# Patient Record
Sex: MALE | Race: WHITE | NOT HISPANIC OR LATINO | Employment: OTHER | URBAN - METROPOLITAN AREA
[De-identification: names, ages, dates, MRNs, and addresses within clinical notes are randomized per-mention and may not be internally consistent; named-entity substitution may affect disease eponyms.]

---

## 2018-01-24 ENCOUNTER — OFFICE VISIT (OUTPATIENT)
Dept: FAMILY MEDICINE | Facility: CLINIC | Age: 33
End: 2018-01-24
Payer: OTHER GOVERNMENT

## 2018-01-24 VITALS
SYSTOLIC BLOOD PRESSURE: 90 MMHG | DIASTOLIC BLOOD PRESSURE: 60 MMHG | RESPIRATION RATE: 16 BRPM | WEIGHT: 152.13 LBS | HEART RATE: 88 BPM | OXYGEN SATURATION: 99 % | HEIGHT: 69 IN | BODY MASS INDEX: 22.53 KG/M2

## 2018-01-24 DIAGNOSIS — R42 POSTURAL DIZZINESS WITH PRESYNCOPE: Primary | ICD-10-CM

## 2018-01-24 DIAGNOSIS — R55 POSTURAL DIZZINESS WITH PRESYNCOPE: Primary | ICD-10-CM

## 2018-01-24 PROCEDURE — 93005 ELECTROCARDIOGRAM TRACING: CPT | Mod: PBBFAC | Performed by: FAMILY MEDICINE

## 2018-01-24 PROCEDURE — 93010 ELECTROCARDIOGRAM REPORT: CPT | Mod: ,,, | Performed by: INTERNAL MEDICINE

## 2018-01-24 PROCEDURE — 99999 PR PBB SHADOW E&M-NEW PATIENT-LVL V: CPT | Mod: PBBFAC,,, | Performed by: FAMILY MEDICINE

## 2018-01-24 PROCEDURE — 99213 OFFICE O/P EST LOW 20 MIN: CPT | Mod: S$PBB,25,, | Performed by: FAMILY MEDICINE

## 2018-01-24 PROCEDURE — 99205 OFFICE O/P NEW HI 60 MIN: CPT | Mod: PBBFAC | Performed by: FAMILY MEDICINE

## 2018-01-24 NOTE — PATIENT INSTRUCTIONS
Understanding Dizziness, Balance Problems, and Fainting     The eyes, inner ear, joints, and muscles send signals to the brain to achieve balance.     Balance is a group effort of the eyes, inner ear, joints, and muscles. They each send signals to the brain about body position and head movement. Then the brain uses this information to achieve balance. When the brain receives conflicting signals, or when there is a problem with blood flow, dizziness or fainting can happen.  Vertigo  Vertigo is the feeling of spinning. It may happen if the brain receives conflicting balance signals. Vertigo is often caused by a problem in the inner ear. Problems include changes in inner ear structures, infection, swelling, or excess fluid. Sometimes vertigo is due to a brain problem, such as migraine.   Dysequilibrium  Dysequilibrium is the feeling of imbalance without a sense of spinning. It may happen if the signal path between the body and brain is disrupted. There are many causes of dysequilibrium, including diabetes, anemia, head injury, and aging.  Syncope  Syncope is losing consciousness or fainting. The brain needs oxygen-rich blood to function. The heart pumps that blood to the brain. If there is a problem with the heart, blood flow (such as low blood pressure), or blood vessels, faintness may happen.  Date Last Reviewed: 10/6/2015  © 8744-2956 [employer]. [address] [place] [address], [place] [address]. All rights reserved. This information is not intended as a substitute for professional medical care. Always follow your healthcare professional's instructions.

## 2018-01-24 NOTE — LETTER
January 24, 2018      [address] [address]  Phone: [phone]  Fax: [phone]       Patient: Gagandeep Osborne   YOB: 1985  Date of Visit: 01/24/2018    To Whom It May Concern:    Hipolito Osborne  was at [facility] on 01/24/2018. He may return to work  1/25/18. He presents today with complaints of feeling faint and was diagnosed with orthostatic hypotension. He is going to cardiology for further work up. This can lead to decreased tolerance of exercise and especially with change in position. He will increase his fluid and salt intake for now and may need medications if his symptoms do not resolve. If you have any questions or concerns, or if I can be of further assistance, please do not hesitate to contact me.    Sincerely,    Nikki Regalado MD

## 2018-01-24 NOTE — PROGRESS NOTES
Subjective:       Patient ID: Gagandeep Osborne is a 32 y.o. male.    Chief Complaint: blacking out/ fainting (every 2 to 3 days)    HPI  32 year old male comes in with c/o syncope. He says that when he stands he has light-headedness and sometimes his vision goes black. He has some 'shocking' feelings in the tongue. He says that he has this every 1-2 days. His wife is here and says that when he does this 'he goes blank.' He has never fallen down. He can 'grab something' to prevent him from falling. He has had the same symptoms since he was a kid.    PMH, PSH, ALLERGIES, SH, FH reviewed in nurse's notes above  Medications reconciled in the nurse's notes      Review of Systems   Constitutional: Negative for chills and fever.   HENT: Negative for congestion, ear pain, postnasal drip, rhinorrhea, sore throat and trouble swallowing.    Eyes: Negative for redness and itching.   Respiratory: Negative for cough, shortness of breath and wheezing.    Cardiovascular: Negative for chest pain and palpitations.   Gastrointestinal: Negative for abdominal pain, diarrhea, nausea and vomiting.   Genitourinary: Negative for dysuria and frequency.   Skin: Negative for rash.   Neurological: Positive for syncope. Negative for weakness and headaches.       Objective:      Physical Exam   Constitutional: He is oriented to person, place, and time. He appears well-developed. No distress.   HENT:   Head: Normocephalic and atraumatic.   Eyes: Conjunctivae are normal. Pupils are equal, round, and reactive to light.   Neck: Normal range of motion. Neck supple. No thyromegaly present.   Cardiovascular: Normal rate, regular rhythm, normal heart sounds and intact distal pulses.    Pulmonary/Chest: Effort normal and breath sounds normal. No respiratory distress. He has no wheezes.   Abdominal: Soft. Bowel sounds are normal. There is no tenderness.   Musculoskeletal: Normal range of motion. He exhibits no edema.   Lymphadenopathy:     He has no cervical  adenopathy.   Neurological: He is alert and oriented to person, place, and time.   Skin: Skin is warm and dry. No rash noted.   Psychiatric: He has a normal mood and affect. His behavior is normal.   Nursing note and vitals reviewed.       Assessment/Plan:       Problem List Items Addressed This Visit     None      Visit Diagnoses     Postural dizziness with presyncope    -  Primary    Relevant Orders    EKG 12-lead    Ambulatory referral to Cardiology        RTC if condition acutely worsens or any other concerns, otherwise RTC as scheduled    Orthostatics:  L:100/54  Sittin/70  Standin/60

## 2018-01-30 ENCOUNTER — OFFICE VISIT (OUTPATIENT)
Dept: FAMILY MEDICINE | Facility: CLINIC | Age: 33
End: 2018-01-30
Payer: OTHER GOVERNMENT

## 2018-01-30 VITALS
DIASTOLIC BLOOD PRESSURE: 70 MMHG | WEIGHT: 154.31 LBS | BODY MASS INDEX: 22.85 KG/M2 | HEART RATE: 76 BPM | SYSTOLIC BLOOD PRESSURE: 106 MMHG | HEIGHT: 69 IN

## 2018-01-30 DIAGNOSIS — Z30.09 VISIT FOR VASECTOMY EVALUATION: Primary | ICD-10-CM

## 2018-01-30 PROCEDURE — 99213 OFFICE O/P EST LOW 20 MIN: CPT | Mod: S$PBB,,, | Performed by: FAMILY MEDICINE

## 2018-01-30 PROCEDURE — 99213 OFFICE O/P EST LOW 20 MIN: CPT | Mod: PBBFAC | Performed by: FAMILY MEDICINE

## 2018-01-30 PROCEDURE — 99999 PR PBB SHADOW E&M-EST. PATIENT-LVL III: CPT | Mod: PBBFAC,,, | Performed by: FAMILY MEDICINE

## 2018-01-30 RX ORDER — DIAZEPAM 5 MG/1
5 TABLET ORAL EVERY 6 HOURS PRN
Qty: 2 TABLET | Refills: 0 | Status: SHIPPED | OUTPATIENT
Start: 2018-01-30 | End: 2018-03-19 | Stop reason: SDUPTHER

## 2018-01-30 NOTE — PROGRESS NOTES
Subjective:       Patient ID: Gagandeep Osborne is a 32 y.o. male.    Chief Complaint: Consult (vasectomy)    Patient is requesting vasectomy.  All of his questions regarding the procedure were answered.  He has 2 children.  They do not want anymore children.  His wife did have a BTL performed.  I told him that vasectomy was not necessary but he insists to get one.      Review of Systems   Constitutional: Negative for activity change, fever and unexpected weight change.   Respiratory: Negative for cough and shortness of breath.    Cardiovascular: Negative for chest pain and palpitations.   Gastrointestinal: Negative for abdominal pain, constipation and diarrhea.   Genitourinary: Negative for discharge, penile pain and testicular pain.       Objective:      Vitals:    01/30/18 0842   BP: 106/70   Pulse: 76     Physical Exam   Constitutional: He appears well-developed and well-nourished.   Eyes: EOM are normal. Pupils are equal, round, and reactive to light.   Cardiovascular: Normal rate, regular rhythm, normal heart sounds and intact distal pulses.  Exam reveals no gallop and no friction rub.    No murmur heard.  Pulmonary/Chest: Effort normal and breath sounds normal.   Abdominal: Soft. Bowel sounds are normal. He exhibits no mass. There is no tenderness. Hernia confirmed negative in the right inguinal area and confirmed negative in the left inguinal area.   Genitourinary: Testes normal and penis normal. Right testis shows no mass. Left testis shows no mass. Circumcised.   Lymphadenopathy: No inguinal adenopathy noted on the right or left side.       Assessment:       1. Visit for vasectomy evaluation        Plan:   Gagandeep was seen today for consult.    Diagnoses and all orders for this visit:    Visit for vasectomy evaluation  -     diazePAM (VALIUM) 5 MG tablet; Take 1 tablet (5 mg total) by mouth every 6 (six) hours as needed for Anxiety.     schedule vasectomy.  Patient given handout.

## 2018-01-30 NOTE — PATIENT INSTRUCTIONS
Having a Vasectomy: Before, During, and After the Procedure     The cut ends of the vas may be tied, closed with a clip, or sealed by heat (cauterized).   Vasectomy is an outpatient procedure. This means you can go home the same day. It can be done in a doctors office, clinic, or hospital. Your doctor will talk with you about how to get ready for surgery. He or she will also discuss the possible risks and complications with you. After the procedure, follow your doctors advice for recovery.  Getting ready for surgery  Your doctor will talk with you about getting ready for surgery. You may be asked to do the following:  · Sign a consent form. This must be done at least a few days before surgery. It gives your doctor permission to do the procedure. It also states that a vasectomy is not guaranteed to make you sterile.  · Dont take aspirin, ibuprofen, or naproxen for 2 weeks before surgery. These medicines can cause bleeding after the procedure. Also, tell your doctor if you take any medicines, supplements, or herbal remedies.  · Tell your doctor if youve had any scrotal surgery in the past.  · Arrange for an adult family member or friend to give you a ride home after surgery.  · Shower and clean your scrotum the day of surgery. Your doctor may also ask you to shave your scrotum.  · Bring a jock strap (athletic supporter) or pair of snug cotton briefs to the doctors office or hospital.  · Eat no more than a light snack before surgery.  During surgery  The entire procedure usually lasts less than 30 minutes.  · Youll be asked to undress and lie on a table.  · You may be given medicine to help you relax. To prevent pain during surgery, youll be given an injection of pain medicine in your scrotum or lower groin.  · Once the area is numb, the doctor makes one or two small incisions in the scrotum. This may be done with a scalpel or with a pointed clamp (no-scalpel method).  · The vas deferens are lifted through the  incision and cut. The provider seals off the ends of the vas deferens using one of several methods.  · If needed, the incision is closed with stitches.  · You can rest for a while until youre ready to go home.  Recovering at home  For about a week, your scrotum may look bruised and slightly swollen. You may also have a small amount of bloody discharge from the incision. This is normal.  To help make your recovery more comfortable, follow the tips below.  · Stay off your feet as much as possible for the first 2 days. Try to lie flat on a bed or sofa.  · Wear an athletic supporter or snug cotton briefs for support.  · Reduce swelling by using an ice pack or bag of frozen peas wrapped in a thin towel. Put the ice pack or towel on your scrotum.  · Take medicines with acetaminophen to relieve any discomfort. Dont use aspirin, ibuprofen, or naproxen.  · Wait 48 hours before bathing.  · Avoid heavy lifting or exercise for 7 days.  · Ask your doctor how long to wait before having sex again. Remember: You must use another form of birth control until youre completely sterile.  When to seek medical care  Call your doctor if you notice any of the following after surgery:  · Increasing pain or swelling in your scrotum  · A large black-and-blue area, or a growing lump  · Fever or chills  · Increasing redness or drainage of the incision  · Trouble urinating   Sex after vasectomy  Vasectomy doesnt change your sexual function. So when you start having sex again, it should feel the same as before. A vasectomy also shouldnt affect your relationship with your partner. Its important to remember, though, that you wont become sterile right away. It will take time before you can have sex without the need for birth control.  · Until youre sterile: After a vasectomy, some active sperm still remain in your semen. It will take time and many ejaculations before the sperm are completely gone. During this period, you must use another  birth control method to prevent pregnancy. To make sure no sperm are left in your semen, youll need to have one or more semen exams. You usually collect a semen sample at home and bring it to a lab. The sample is then checked under a microscope. Youre sterile only when these samples show no evidence of sperm. Ask your doctor whether additional follow-up is needed.  · After youre sterile: After your doctor tells you youre sterile, you no longer need to use any form of birth control. Youre free to have sex without the fear of unwanted pregnancy. But a vasectomy does not protect you from sexually transmitted diseases (STDs). If you have more than one sex partner, be sure to practice safer sex by using condoms.  Date Last Reviewed: 1/1/2017  © 4770-4471 [employer]. [address] [place] [address], [place] [address]. All rights reserved. This information is not intended as a substitute for professional medical care. Always follow your healthcare professional's instructions.

## 2018-03-16 ENCOUNTER — TELEPHONE (OUTPATIENT)
Dept: FAMILY MEDICINE | Facility: CLINIC | Age: 33
End: 2018-03-16

## 2018-03-19 DIAGNOSIS — Z30.09 VISIT FOR VASECTOMY EVALUATION: ICD-10-CM

## 2018-03-19 RX ORDER — DIAZEPAM 5 MG/1
5 TABLET ORAL EVERY 6 HOURS PRN
Qty: 2 TABLET | Refills: 0 | Status: SHIPPED | OUTPATIENT
Start: 2018-03-19 | End: 2018-03-28 | Stop reason: ALTCHOICE

## 2018-03-19 NOTE — TELEPHONE ENCOUNTER
----- Message from Willis Regan sent at 3/19/2018 11:53 AM CDT -----  Contact: Patient  Gagandeep Osboren  MRN: [id number]  : 1985  PCP: Nikki Regalado  Home Phone      [phone]  Work Phone      Not on file.  Mobile          [phone]      MESSAGE: has appt Wed for Vasectomy with Dr Montes -- requesting Rx for Valium (did not  the one sent in  -- uses [facility] in [place]    Call [phone]    PCP: Sabine

## 2018-03-21 ENCOUNTER — OFFICE VISIT (OUTPATIENT)
Dept: FAMILY MEDICINE | Facility: CLINIC | Age: 33
End: 2018-03-21
Payer: OTHER GOVERNMENT

## 2018-03-21 VITALS
WEIGHT: 152.63 LBS | RESPIRATION RATE: 18 BRPM | HEIGHT: 69 IN | SYSTOLIC BLOOD PRESSURE: 118 MMHG | DIASTOLIC BLOOD PRESSURE: 60 MMHG | BODY MASS INDEX: 22.61 KG/M2 | HEART RATE: 68 BPM

## 2018-03-21 DIAGNOSIS — Z30.2 ENCOUNTER FOR VASECTOMY: Primary | ICD-10-CM

## 2018-03-21 PROCEDURE — 55250 REMOVAL OF SPERM DUCT(S): CPT | Mod: PBBFAC | Performed by: FAMILY MEDICINE

## 2018-03-21 PROCEDURE — 88302 TISSUE EXAM BY PATHOLOGIST: CPT | Mod: 26,,, | Performed by: PATHOLOGY

## 2018-03-21 PROCEDURE — 88305 TISSUE EXAM BY PATHOLOGIST: CPT | Performed by: PATHOLOGY

## 2018-03-21 PROCEDURE — 99213 OFFICE O/P EST LOW 20 MIN: CPT | Mod: PBBFAC,25 | Performed by: FAMILY MEDICINE

## 2018-03-21 PROCEDURE — 88305 TISSUE EXAM BY PATHOLOGIST: CPT | Mod: 26,,, | Performed by: PATHOLOGY

## 2018-03-21 PROCEDURE — 99499 UNLISTED E&M SERVICE: CPT | Mod: S$PBB,,, | Performed by: FAMILY MEDICINE

## 2018-03-21 PROCEDURE — 55250 REMOVAL OF SPERM DUCT(S): CPT | Mod: S$PBB,,, | Performed by: FAMILY MEDICINE

## 2018-03-21 PROCEDURE — 99999 PR PBB SHADOW E&M-EST. PATIENT-LVL III: CPT | Mod: PBBFAC,,, | Performed by: FAMILY MEDICINE

## 2018-03-21 RX ORDER — HYDROCODONE BITARTRATE AND ACETAMINOPHEN 7.5; 325 MG/1; MG/1
1 TABLET ORAL EVERY 6 HOURS PRN
Qty: 15 TABLET | Refills: 0 | Status: SHIPPED | OUTPATIENT
Start: 2018-03-21 | End: 2018-03-28 | Stop reason: SDUPTHER

## 2018-03-21 NOTE — PROGRESS NOTES
Gagandeep Osborne is a 32 y.o. male    Procedure: Vasectomy    Preop diagnosis: Desired fertility  Postop diagnosis: Vasectomy  Estimated blood loss 5 cc    Procedure in detail: After patient signed informed consent and answering all of his questions procedure began.  The scrotum was prepped and draped sterilely using Betadine.  The right scrotum was anesthetized using 1% Xylocaine.  The vas deferens was palpated and right against the scrotal skin.  Linear incision was made using a 15 scalpel blade.  The vas deferens was located and isolated using 2 needle clamps.  The vas was brought out from the incision using blunt and sharp dissection.  The distal end was tied using 3-0 chromic.  10 mm section of the vas deferens was removed.  The proximal end was dropped back into the scrotum.  The distal end was hyfrecated and the Tunica fascia was closed using 3-0 chromic using an interlocking running suture leaving the distal end outside the tunica.   This was dropped back into the scrotum.  The scrotal incision was closed using 3-0 chromic using running interlocking fashion.  The left side was performed using identical technique as the right and no hematoma or unusual bleeding was noted.  The specimens were sent to pathology.   Wounds were cleaned and dressed.  He put on his scrotal support and felt fine.  After half hour of observation he was allowed to go home.  He will return to clinic in 6 weeks for a sperm count.    Encounter for vasectomy  -     Vasectomy  -     hydrocodone-acetaminophen 7.5-325mg (NORCO) 7.5-325 mg per tablet; Take 1 tablet by mouth every 6 (six) hours as needed for Pain.  Dispense: 15 tablet; Refill: 0  -     Tissue Specimen To Pathology, Internal Medicine

## 2018-03-28 ENCOUNTER — OFFICE VISIT (OUTPATIENT)
Dept: FAMILY MEDICINE | Facility: CLINIC | Age: 33
End: 2018-03-28
Payer: OTHER GOVERNMENT

## 2018-03-28 VITALS
BODY MASS INDEX: 22.14 KG/M2 | HEIGHT: 69 IN | HEART RATE: 76 BPM | SYSTOLIC BLOOD PRESSURE: 98 MMHG | WEIGHT: 149.5 LBS | DIASTOLIC BLOOD PRESSURE: 64 MMHG | RESPIRATION RATE: 16 BRPM

## 2018-03-28 DIAGNOSIS — Z30.2 ENCOUNTER FOR VASECTOMY: ICD-10-CM

## 2018-03-28 DIAGNOSIS — N50.819 EVALUATION OF POSTOPERATIVE TESTICULAR PAIN WITHIN 30 DAYS OF VASECTOMY: Primary | ICD-10-CM

## 2018-03-28 DIAGNOSIS — G89.18 EVALUATION OF POSTOPERATIVE TESTICULAR PAIN WITHIN 30 DAYS OF VASECTOMY: Primary | ICD-10-CM

## 2018-03-28 PROCEDURE — 99213 OFFICE O/P EST LOW 20 MIN: CPT | Mod: PBBFAC,25 | Performed by: FAMILY MEDICINE

## 2018-03-28 PROCEDURE — 96372 THER/PROPH/DIAG INJ SC/IM: CPT | Mod: PBBFAC

## 2018-03-28 PROCEDURE — 99999 PR PBB SHADOW E&M-EST. PATIENT-LVL III: CPT | Mod: PBBFAC,,, | Performed by: FAMILY MEDICINE

## 2018-03-28 PROCEDURE — 99213 OFFICE O/P EST LOW 20 MIN: CPT | Mod: S$PBB,25,, | Performed by: FAMILY MEDICINE

## 2018-03-28 RX ORDER — METHYLPREDNISOLONE ACETATE 40 MG/ML
40 INJECTION, SUSPENSION INTRA-ARTICULAR; INTRALESIONAL; INTRAMUSCULAR; SOFT TISSUE
Status: COMPLETED | OUTPATIENT
Start: 2018-03-28 | End: 2018-03-28

## 2018-03-28 RX ORDER — HYDROCODONE BITARTRATE AND ACETAMINOPHEN 7.5; 325 MG/1; MG/1
1 TABLET ORAL EVERY 6 HOURS PRN
Qty: 15 TABLET | Refills: 0 | Status: SHIPPED | OUTPATIENT
Start: 2018-03-28 | End: 2018-04-07

## 2018-03-28 RX ORDER — IBUPROFEN 800 MG/1
800 TABLET ORAL 3 TIMES DAILY
Qty: 30 TABLET | Refills: 0 | Status: SHIPPED | OUTPATIENT
Start: 2018-03-28

## 2018-03-28 RX ADMIN — METHYLPREDNISOLONE ACETATE 40 MG: 40 INJECTION, SUSPENSION INTRA-ARTICULAR; INTRALESIONAL; INTRAMUSCULAR; SOFT TISSUE at 09:03

## 2018-03-28 NOTE — PROGRESS NOTES
Subjective:       Patient ID: Gagandeep Osborne is a 32 y.o. male.    Chief Complaint: Follow-up (1wk procedure follow up)    HPI  32 year old male comes in for f/u vasectomy last wedneday. He says that he has had continued soreness when he doesn't have support. He notes some initial minimal bruising and continued soreness proximal to the testicles bilaterally. He has pain in the right greater than the left.    PMH, PSH, ALLERGIES, SH, FH reviewed in nurse's notes above  Medications reconciled in the nurse's notes      Review of Systems   Constitutional: Negative for chills and fever.   Respiratory: Negative for cough, shortness of breath and wheezing.    Cardiovascular: Negative for chest pain and palpitations.   Gastrointestinal: Negative for vomiting.   Genitourinary: Positive for scrotal swelling and testicular pain.   Skin: Negative for rash.       Objective:      Physical Exam   Genitourinary: Penis normal.   Genitourinary Comments: Bilateral tenderness in the spermatic cord bilaterally.  Minimal swelling noted to proximal cord.  Testicles without bruising or swelling noted.        Assessment/Plan:       Problem List Items Addressed This Visit     None      Visit Diagnoses     Evaluation of postoperative testicular pain within 30 days of vasectomy    -  Primary    Relevant Medications    methylPREDNISolone acetate injection 40 mg (Start on 3/28/2018  9:15 AM)    ibuprofen (ADVIL,MOTRIN) 800 MG tablet    Encounter for vasectomy        Relevant Medications    hydrocodone-acetaminophen 7.5-325mg (NORCO) 7.5-325 mg per tablet      RTC if condition acutely worsens or any other concerns, otherwise RTC as scheduled    Continue scrotal support. If still with pain rtc in 1 week

## 2018-04-06 ENCOUNTER — TELEPHONE (OUTPATIENT)
Dept: ADMINISTRATIVE | Facility: HOSPITAL | Age: 33
End: 2018-04-06

## 2018-05-15 ENCOUNTER — TELEPHONE (OUTPATIENT)
Dept: FAMILY MEDICINE | Facility: CLINIC | Age: 33
End: 2018-05-15

## 2018-05-15 NOTE — TELEPHONE ENCOUNTER
----- Message from Willis Regan sent at 5/15/2018 12:42 PM CDT -----  Contact: Patient  Gagandeep Osborne  MRN: [id number]  : 1985  PCP: Nikki Regalado  Home Phone      [phone]  Work Phone      Not on file.  Mobile          [phone]      MESSAGE: he is signed up as a bone marrow donor -- got a call out -- would like to speak with Dr Regalado to get her opinion on something    Call [phone]    PCP: Sabine

## 2018-07-02 ENCOUNTER — OFFICE VISIT (OUTPATIENT)
Dept: FAMILY MEDICINE | Facility: CLINIC | Age: 33
End: 2018-07-02
Payer: OTHER GOVERNMENT

## 2018-07-02 VITALS
WEIGHT: 152 LBS | HEART RATE: 72 BPM | DIASTOLIC BLOOD PRESSURE: 68 MMHG | RESPIRATION RATE: 16 BRPM | SYSTOLIC BLOOD PRESSURE: 116 MMHG | BODY MASS INDEX: 22.51 KG/M2 | HEIGHT: 69 IN

## 2018-07-02 DIAGNOSIS — N50.819 EVALUATION OF POSTOPERATIVE TESTICULAR PAIN WITHIN 30 DAYS OF VASECTOMY: Primary | ICD-10-CM

## 2018-07-02 DIAGNOSIS — G89.18 EVALUATION OF POSTOPERATIVE TESTICULAR PAIN WITHIN 30 DAYS OF VASECTOMY: Primary | ICD-10-CM

## 2018-07-02 PROCEDURE — 99212 OFFICE O/P EST SF 10 MIN: CPT | Mod: S$PBB,,, | Performed by: FAMILY MEDICINE

## 2018-07-02 PROCEDURE — 99999 PR PBB SHADOW E&M-EST. PATIENT-LVL III: CPT | Mod: PBBFAC,,, | Performed by: FAMILY MEDICINE

## 2018-07-02 PROCEDURE — 99213 OFFICE O/P EST LOW 20 MIN: CPT | Mod: PBBFAC | Performed by: FAMILY MEDICINE

## 2018-07-02 NOTE — PROGRESS NOTES
Subjective:       Patient ID: Gagandeep Osborne is a 32 y.o. male.    Chief Complaint: Follow-up    Here for post vasectomy check up.  Doing well      Review of Systems    Objective:      Vitals:    07/02/18 0815   BP: 116/68   Pulse: 72   Resp: 16     Physical Exam   Constitutional: He appears well-developed and well-nourished.   Genitourinary: Testes normal and penis normal.       Sperm analysis - no sperm seen under microscope  Assessment:       1. Evaluation of postoperative testicular pain within 30 days of vasectomy        Plan:   Gagandeep was seen today for follow-up.    Diagnoses and all orders for this visit:    Evaluation of postoperative testicular pain within 30 days of vasectomy    RTC as needed